# Patient Record
Sex: FEMALE | Race: WHITE | URBAN - METROPOLITAN AREA
[De-identification: names, ages, dates, MRNs, and addresses within clinical notes are randomized per-mention and may not be internally consistent; named-entity substitution may affect disease eponyms.]

---

## 2017-01-23 ENCOUNTER — EMERGENCY (EMERGENCY)
Facility: HOSPITAL | Age: 82
LOS: 1 days | Discharge: OTHER INSTITUTION | End: 2017-01-23
Attending: EMERGENCY MEDICINE | Admitting: EMERGENCY MEDICINE
Payer: MEDICARE

## 2017-01-23 VITALS
RESPIRATION RATE: 16 BRPM | HEART RATE: 86 BPM | OXYGEN SATURATION: 96 % | TEMPERATURE: 98 F | SYSTOLIC BLOOD PRESSURE: 113 MMHG | DIASTOLIC BLOOD PRESSURE: 77 MMHG

## 2017-01-23 VITALS
SYSTOLIC BLOOD PRESSURE: 114 MMHG | DIASTOLIC BLOOD PRESSURE: 79 MMHG | TEMPERATURE: 98 F | HEART RATE: 94 BPM | RESPIRATION RATE: 16 BRPM | OXYGEN SATURATION: 95 %

## 2017-01-23 DIAGNOSIS — I10 ESSENTIAL (PRIMARY) HYPERTENSION: ICD-10-CM

## 2017-01-23 DIAGNOSIS — Z79.899 OTHER LONG TERM (CURRENT) DRUG THERAPY: ICD-10-CM

## 2017-01-23 DIAGNOSIS — S32.029A UNSPECIFIED FRACTURE OF SECOND LUMBAR VERTEBRA, INITIAL ENCOUNTER FOR CLOSED FRACTURE: ICD-10-CM

## 2017-01-23 DIAGNOSIS — S32.402A UNSPECIFIED FRACTURE OF LEFT ACETABULUM, INITIAL ENCOUNTER FOR CLOSED FRACTURE: ICD-10-CM

## 2017-01-23 DIAGNOSIS — Z88.2 ALLERGY STATUS TO SULFONAMIDES: ICD-10-CM

## 2017-01-23 DIAGNOSIS — R10.84 GENERALIZED ABDOMINAL PAIN: ICD-10-CM

## 2017-01-23 DIAGNOSIS — R74.8 ABNORMAL LEVELS OF OTHER SERUM ENZYMES: ICD-10-CM

## 2017-01-23 DIAGNOSIS — Z98.890 OTHER SPECIFIED POSTPROCEDURAL STATES: Chronic | ICD-10-CM

## 2017-01-23 LAB
ALBUMIN SERPL ELPH-MCNC: 2.4 G/DL — LOW (ref 3.4–5)
ALP SERPL-CCNC: 142 U/L — HIGH (ref 40–120)
ALT FLD-CCNC: 281 U/L — HIGH (ref 12–42)
ANION GAP SERPL CALC-SCNC: 11 MMOL/L — SIGNIFICANT CHANGE UP (ref 9–16)
APPEARANCE UR: CLEAR — SIGNIFICANT CHANGE UP
APTT BLD: 29.3 SEC — SIGNIFICANT CHANGE UP (ref 27.5–36.5)
AST SERPL-CCNC: 425 U/L — HIGH (ref 15–37)
BASOPHILS NFR BLD AUTO: 0.1 % — SIGNIFICANT CHANGE UP (ref 0–2)
BILIRUB SERPL-MCNC: 1.4 MG/DL — HIGH (ref 0.2–1.2)
BILIRUB UR-MCNC: (no result)
BUN SERPL-MCNC: 30 MG/DL — HIGH (ref 7–23)
CALCIUM SERPL-MCNC: 8.8 MG/DL — SIGNIFICANT CHANGE UP (ref 8.5–10.5)
CHLORIDE SERPL-SCNC: 103 MMOL/L — SIGNIFICANT CHANGE UP (ref 96–108)
CK SERPL-CCNC: 149 U/L — SIGNIFICANT CHANGE UP (ref 26–192)
CO2 SERPL-SCNC: 27 MMOL/L — SIGNIFICANT CHANGE UP (ref 22–31)
COLOR SPEC: YELLOW — SIGNIFICANT CHANGE UP
CREAT SERPL-MCNC: 1.64 MG/DL — HIGH (ref 0.5–1.3)
DIFF PNL FLD: (no result)
EOSINOPHIL NFR BLD AUTO: 0 % — SIGNIFICANT CHANGE UP (ref 0–6)
GLUCOSE SERPL-MCNC: 214 MG/DL — HIGH (ref 70–99)
GLUCOSE UR QL: NEGATIVE — SIGNIFICANT CHANGE UP
HCT VFR BLD CALC: 32 % — LOW (ref 34.5–45)
HGB BLD-MCNC: 9.6 G/DL — LOW (ref 11.5–15.5)
IMM GRANULOCYTES NFR BLD AUTO: 1.4 % — SIGNIFICANT CHANGE UP (ref 0–1.5)
INR BLD: 1.37 — HIGH (ref 0.88–1.16)
KETONES UR-MCNC: 15 MG/DL
LACTATE SERPL-SCNC: 1.7 MMOL/L — SIGNIFICANT CHANGE UP (ref 0.4–2)
LEUKOCYTE ESTERASE UR-ACNC: NEGATIVE — SIGNIFICANT CHANGE UP
LYMPHOCYTES # BLD AUTO: 9.3 % — LOW (ref 13–44)
MCHC RBC-ENTMCNC: 27.2 PG — SIGNIFICANT CHANGE UP (ref 27–34)
MCHC RBC-ENTMCNC: 30 G/DL — LOW (ref 32–36)
MCV RBC AUTO: 90.7 FL — SIGNIFICANT CHANGE UP (ref 80–100)
MONOCYTES NFR BLD AUTO: 5.7 % — SIGNIFICANT CHANGE UP (ref 2–14)
NEUTROPHILS NFR BLD AUTO: 83.5 % — HIGH (ref 43–77)
NITRITE UR-MCNC: NEGATIVE — SIGNIFICANT CHANGE UP
PH UR: 5.5 — SIGNIFICANT CHANGE UP (ref 4–8.1)
PLATELET # BLD AUTO: 194 K/UL — SIGNIFICANT CHANGE UP (ref 150–400)
POTASSIUM SERPL-MCNC: 5.5 MMOL/L — HIGH (ref 3.5–5.3)
POTASSIUM SERPL-SCNC: 5.5 MMOL/L — HIGH (ref 3.5–5.3)
PROT SERPL-MCNC: 5.8 G/DL — LOW (ref 6.4–8.2)
PROT UR-MCNC: 30 MG/DL
PROTHROM AB SERPL-ACNC: 15.1 SEC — HIGH (ref 10–13.1)
RBC # BLD: 3.53 M/UL — LOW (ref 3.8–5.2)
RBC # FLD: 18 % — HIGH (ref 10.3–16.9)
SODIUM SERPL-SCNC: 141 MMOL/L — SIGNIFICANT CHANGE UP (ref 132–145)
SP GR SPEC: >=1.03 — SIGNIFICANT CHANGE UP (ref 1–1.03)
TROPONIN I SERPL-MCNC: 0.94 NG/ML — CRITICAL HIGH (ref 0.02–0.06)
UROBILINOGEN FLD QL: 1 E.U./DL — SIGNIFICANT CHANGE UP
WBC # BLD: 7.1 K/UL — SIGNIFICANT CHANGE UP (ref 3.8–10.5)
WBC # FLD AUTO: 7.1 K/UL — SIGNIFICANT CHANGE UP (ref 3.8–10.5)

## 2017-01-23 PROCEDURE — 99285 EMERGENCY DEPT VISIT HI MDM: CPT | Mod: 25

## 2017-01-23 PROCEDURE — 71010: CPT | Mod: 26

## 2017-01-23 PROCEDURE — 93010 ELECTROCARDIOGRAM REPORT: CPT

## 2017-01-23 PROCEDURE — 70450 CT HEAD/BRAIN W/O DYE: CPT | Mod: 26

## 2017-01-23 PROCEDURE — 74176 CT ABD & PELVIS W/O CONTRAST: CPT | Mod: 26

## 2017-01-23 PROCEDURE — 72125 CT NECK SPINE W/O DYE: CPT | Mod: 26

## 2017-01-23 RX ORDER — CLONAZEPAM 1 MG
0.5 TABLET ORAL ONCE
Qty: 0 | Refills: 0 | Status: DISCONTINUED | OUTPATIENT
Start: 2017-01-23 | End: 2017-01-23

## 2017-01-23 RX ORDER — APIXABAN 2.5 MG/1
1 TABLET, FILM COATED ORAL
Qty: 0 | Refills: 0 | COMMUNITY

## 2017-01-23 RX ORDER — SODIUM CHLORIDE 9 MG/ML
500 INJECTION INTRAMUSCULAR; INTRAVENOUS; SUBCUTANEOUS ONCE
Qty: 0 | Refills: 0 | Status: COMPLETED | OUTPATIENT
Start: 2017-01-23 | End: 2017-01-23

## 2017-01-23 RX ORDER — CELECOXIB 200 MG/1
1 CAPSULE ORAL
Qty: 0 | Refills: 0 | COMMUNITY

## 2017-01-23 RX ORDER — ESOMEPRAZOLE MAGNESIUM 40 MG/1
1 CAPSULE, DELAYED RELEASE ORAL
Qty: 0 | Refills: 0 | COMMUNITY

## 2017-01-23 RX ORDER — CEFTRIAXONE 500 MG/1
1 INJECTION, POWDER, FOR SOLUTION INTRAMUSCULAR; INTRAVENOUS ONCE
Qty: 0 | Refills: 0 | Status: COMPLETED | OUTPATIENT
Start: 2017-01-23 | End: 2017-01-23

## 2017-01-23 RX ORDER — AMIODARONE HYDROCHLORIDE 400 MG/1
0 TABLET ORAL
Qty: 0 | Refills: 0 | COMMUNITY

## 2017-01-23 RX ADMIN — SODIUM CHLORIDE 500 MILLILITER(S): 9 INJECTION INTRAMUSCULAR; INTRAVENOUS; SUBCUTANEOUS at 13:50

## 2017-01-23 RX ADMIN — Medication 0.5 MILLIGRAM(S): at 17:01

## 2017-01-23 RX ADMIN — CEFTRIAXONE 100 GRAM(S): 500 INJECTION, POWDER, FOR SOLUTION INTRAMUSCULAR; INTRAVENOUS at 16:04

## 2017-01-23 RX ADMIN — SODIUM CHLORIDE 500 MILLILITER(S): 9 INJECTION INTRAMUSCULAR; INTRAVENOUS; SUBCUTANEOUS at 12:37

## 2017-01-23 NOTE — ED PROVIDER NOTE - OBJECTIVE STATEMENT
80yo F w/ PE on eliquis, pyoderma (not currently on immunosuppressants), HTN, ruptured sigmoid s/p ostomy and reversal (was on high dose prednisone at that time), presents after mech fall at home the night before last, states she was walking to bathroom and tripped on suitcase strap and fell on L side, denies head trauma, called EMS and was helped to bed but declined transport to ED at that time.  Since then, has had worsening low back pain, difficulty ambulating, abd pain, nausea.  Has HHA during the day and per aide at bedside pt was unable to ambulate yest and this AM (usually ambulates w/ cane).  Also had episode of diarrhea/incontinence this AM after taking dose of kayexelate.  Had 3 recent admissions to [facility] in recent months, respective diagnoses: PE, superinfected pyoderma, hyperkalemia. 82yo F w/ PE on eliquis, pyoderma, HTN, ruptured sigmoid s/p ostomy and reversal (was on high dose prednisone at that time), presents after mech fall at home the night before last, states she was walking to bathroom and tripped on suitcase strap and fell on L side, denies head trauma, called EMS and was helped to bed but declined transport to ED at that time.  Since then, has had worsening low back pain, difficulty ambulating, abd pain, nausea.  Has HHA during the day and per aide at bedside pt was unable to ambulate yest and this AM (usually ambulates w/ cane).  Also had episode of diarrhea/incontinence this AM after taking dose of kayexelate.  Had 3 recent admissions to [facility] in recent months, respective diagnoses: PE, superinfected pyoderma, hyperkalemia. 82yo F w/ PE on eliquis, pyoderma, HTN, ruptured sigmoid s/p ostomy and reversal (was on high dose prednisone at that time), presents after mech fall at home the night before last, states she was walking to bathroom and tripped on suitcase strap and fell on L side, denies head trauma, called EMS and was helped to bed but declined transport to ED at that time.  Since then, has had worsening low back pain, difficulty ambulating, abd pain, nausea.  Has HHA during the day and per aide at bedside pt was unable to ambulate yest and this AM (usually ambulates w/ cane).  Also had episode of diarrhea/incontinence this AM after taking dose of kayexelate.  Had 3 recent admissions to [facility] in recent months, respective diagnoses: PE, superinfected pyoderma, hyperkalemia.  PMD: Letty Albarado ([facility])

## 2017-01-23 NOTE — ED PROVIDER NOTE - PROGRESS NOTE DETAILS
D/w pt, aware of results and agrees w/ plan for admission.  D/w Dr. Law (covering for PMD Dr. Albarado), agrees w/ plan for admission and would like pt admitted to [facility] under Dr. Marcia Case. Will call transfer center. D/w [facility] transfer center D/w [facility] transfer center who asked for full info to be emailed to them.  Emailed via secure [employer] email.  Called back by pt's PMD Dr. Albarado who d/w Dr. Case, no floor beds available at present so if transferred to [facility] would need to go ED to ED.  She will call [facility] ED attg and call back. D/w Dr. Albarado who d/w [facility] ED attg Dr. Garrett who accepted pt for ED to ED transfer.  I also d/w Dr. Garrett.  Will initiate transfer.

## 2017-01-23 NOTE — ED PROVIDER NOTE - CARE PLAN
Principal Discharge DX:	Closed fracture of left acetabulum, unspecified portion of acetabulum, initial encounter  Secondary Diagnosis:	Elevated troponin  Secondary Diagnosis:	Fracture of L2 vertebra

## 2017-01-23 NOTE — ED PROVIDER NOTE - ATTENDING CONTRIBUTION TO CARE
81 yof pw fall 2 nights ago, tripped on suitcase.  on eliquis for PE.  pt denies head trauma.  pt c/o lower back pain since then, w/ difficulty ambulating and abd pain.      agree w/ resident findings, pt ao x 3, no obvious hematoma noted to face/scalp, soft abd but diffusely tender, no deformity noted in LE.  agree w/ labs, screening ekg, CT head w/ c-spine, ct a/p eval for acute pathology.     CT findings given clinical context likely acute than chronic, elevated troponin possibly 2/2 demand ischemia, pt denies chest pain, no sob, will continue trend.  PMD from OSH, will attempt to transfer per patient request.

## 2017-01-23 NOTE — ED ADULT TRIAGE NOTE - CHIEF COMPLAINT QUOTE
Pt with fall yesterday, [facility] arrived and placed her back in bed, today pt took a Potassium supplement and vomited

## 2017-01-23 NOTE — ED PROVIDER NOTE - CONSTITUTIONAL, MLM
normal... Well appearing, well nourished, awake, alert, oriented to person, place, time/situation and in no apparent distress. Well appearing, well nourished, awake, alert, oriented to person, place, time/situation

## 2017-01-23 NOTE — ED ADULT NURSE NOTE - OBJECTIVE STATEMENT
Pt states that she uses 3L of home O2 for anxiety. Pt c/o cervical spinal tenderness s/p fall 2 nights ago. Pt also has generalized abdominal pain when her abdomen is palpated. She c/o nausea and decreased PO intake because of it. Denies fevers. Pt on 3L NC now. States that she uses 3L of home O2 for anxiety. Pt c/o cervical spinal tenderness s/p fall 2 nights ago. Pt also has generalized abdominal pain when her abdomen is palpated. She c/o nausea and decreased PO intake because of it. She also c/o diarrhea from taking kayexalate due to hyperkalemia. Denies fevers. Pt on 3L NC now. States that she uses 3L of home O2 for anxiety.

## 2017-01-23 NOTE — ED PROVIDER NOTE - MEDICAL DECISION MAKING DETAILS
mech fall 2d ago, also w/ possible primary abd pathology, will check labs, ekg, xray chest/pelvis, CT head/c-spine/a/p, IVF, reassess and likely admit

## 2017-01-29 LAB
CULTURE RESULTS: SIGNIFICANT CHANGE UP
CULTURE RESULTS: SIGNIFICANT CHANGE UP
SPECIMEN SOURCE: SIGNIFICANT CHANGE UP
SPECIMEN SOURCE: SIGNIFICANT CHANGE UP

## 2017-04-21 PROBLEM — I26.99 OTHER PULMONARY EMBOLISM WITHOUT ACUTE COR PULMONALE: Chronic | Status: ACTIVE | Noted: 2017-01-23

## 2017-04-21 PROBLEM — E87.5 HYPERKALEMIA: Chronic | Status: ACTIVE | Noted: 2017-01-23

## 2017-04-21 PROBLEM — L08.0 PYODERMA: Chronic | Status: ACTIVE | Noted: 2017-01-23

## 2017-04-21 PROBLEM — I10 ESSENTIAL (PRIMARY) HYPERTENSION: Chronic | Status: ACTIVE | Noted: 2017-01-23
